# Patient Record
Sex: FEMALE | Race: WHITE | NOT HISPANIC OR LATINO | Employment: FULL TIME | URBAN - METROPOLITAN AREA
[De-identification: names, ages, dates, MRNs, and addresses within clinical notes are randomized per-mention and may not be internally consistent; named-entity substitution may affect disease eponyms.]

---

## 2023-05-19 LAB
ALANINE AMINOTRANSFERASE (SGPT) (U/L) IN SER/PLAS: 22 U/L (ref 7–45)
ALBUMIN (G/DL) IN SER/PLAS: 4.4 G/DL (ref 3.4–5)
ALBUMIN (MG/L) IN URINE: 7.9 MG/L
ALBUMIN/CREATININE (UG/MG) IN URINE: 7.2 UG/MG CRT (ref 0–30)
ALKALINE PHOSPHATASE (U/L) IN SER/PLAS: 68 U/L (ref 33–110)
ANION GAP IN SER/PLAS: 11 MMOL/L (ref 10–20)
ASPARTATE AMINOTRANSFERASE (SGOT) (U/L) IN SER/PLAS: 18 U/L (ref 9–39)
BILIRUBIN TOTAL (MG/DL) IN SER/PLAS: 0.5 MG/DL (ref 0–1.2)
CALCIDIOL (25 OH VITAMIN D3) (NG/ML) IN SER/PLAS: 48 NG/ML
CALCIUM (MG/DL) IN SER/PLAS: 10.2 MG/DL (ref 8.6–10.6)
CARBON DIOXIDE, TOTAL (MMOL/L) IN SER/PLAS: 30 MMOL/L (ref 21–32)
CHLORIDE (MMOL/L) IN SER/PLAS: 102 MMOL/L (ref 98–107)
CHOLESTEROL (MG/DL) IN SER/PLAS: 195 MG/DL (ref 0–199)
CHOLESTEROL IN HDL (MG/DL) IN SER/PLAS: 49.8 MG/DL
CHOLESTEROL/HDL RATIO: 3.9
CREATININE (MG/DL) IN SER/PLAS: 0.66 MG/DL (ref 0.5–1.05)
CREATININE (MG/DL) IN URINE: 109 MG/DL (ref 20–320)
ESTIMATED AVERAGE GLUCOSE FOR HBA1C: 134 MG/DL
GAMMA GLUTAMYL TRANSFERASE (U/L) IN SER/PLAS: 15 U/L (ref 5–55)
GFR FEMALE: >90 ML/MIN/1.73M2
GLUCOSE (MG/DL) IN SER/PLAS: 132 MG/DL (ref 74–99)
HEMOGLOBIN A1C/HEMOGLOBIN TOTAL IN BLOOD: 6.3 %
LDL: 124 MG/DL (ref 0–99)
POTASSIUM (MMOL/L) IN SER/PLAS: 4.9 MMOL/L (ref 3.5–5.3)
PROTEIN TOTAL: 7.6 G/DL (ref 6.4–8.2)
SODIUM (MMOL/L) IN SER/PLAS: 138 MMOL/L (ref 136–145)
THYROTROPIN (MIU/L) IN SER/PLAS BY DETECTION LIMIT <= 0.05 MIU/L: 2.3 MIU/L (ref 0.44–3.98)
TRIGLYCERIDE (MG/DL) IN SER/PLAS: 106 MG/DL (ref 0–149)
UREA NITROGEN (MG/DL) IN SER/PLAS: 12 MG/DL (ref 6–23)
VLDL: 21 MG/DL (ref 0–40)

## 2023-06-04 LAB — FECAL OCCULT BLD IMMUNOASSAY: NEGATIVE

## 2023-06-06 PROBLEM — E66.9 DIABETES MELLITUS TYPE 2 IN OBESE: Status: ACTIVE | Noted: 2022-03-01

## 2023-06-06 PROBLEM — E78.5 HYPERLIPIDEMIA: Status: ACTIVE | Noted: 2023-06-06

## 2023-06-06 PROBLEM — E11.69 DIABETES MELLITUS TYPE 2 IN OBESE: Status: ACTIVE | Noted: 2022-03-01

## 2023-06-06 RX ORDER — DAPAGLIFLOZIN 5 MG/1
TABLET, FILM COATED ORAL
COMMUNITY
Start: 2023-02-23 | End: 2023-06-07 | Stop reason: ALTCHOICE

## 2023-06-06 RX ORDER — SEMAGLUTIDE 1.34 MG/ML
INJECTION, SOLUTION SUBCUTANEOUS
COMMUNITY
Start: 2022-05-10 | End: 2023-06-06 | Stop reason: WASHOUT

## 2023-06-06 RX ORDER — SEMAGLUTIDE 1.34 MG/ML
INJECTION, SOLUTION SUBCUTANEOUS
COMMUNITY
Start: 2023-05-29 | End: 2023-12-01 | Stop reason: WASHOUT

## 2023-06-06 RX ORDER — ROSUVASTATIN CALCIUM 5 MG/1
1 TABLET, COATED ORAL DAILY
COMMUNITY
Start: 2022-04-25 | End: 2024-06-03 | Stop reason: SDUPTHER

## 2023-06-07 ENCOUNTER — OFFICE VISIT (OUTPATIENT)
Dept: PRIMARY CARE | Facility: CLINIC | Age: 58
End: 2023-06-07
Payer: COMMERCIAL

## 2023-06-07 VITALS
DIASTOLIC BLOOD PRESSURE: 78 MMHG | SYSTOLIC BLOOD PRESSURE: 115 MMHG | HEART RATE: 96 BPM | BODY MASS INDEX: 31.69 KG/M2 | WEIGHT: 162.25 LBS | TEMPERATURE: 97.6 F

## 2023-06-07 DIAGNOSIS — R13.10 DYSPHAGIA, UNSPECIFIED TYPE: ICD-10-CM

## 2023-06-07 DIAGNOSIS — E11.69 DIABETES MELLITUS TYPE 2 IN OBESE: Primary | ICD-10-CM

## 2023-06-07 DIAGNOSIS — E78.5 HYPERLIPIDEMIA, UNSPECIFIED HYPERLIPIDEMIA TYPE: ICD-10-CM

## 2023-06-07 DIAGNOSIS — Z12.39 BREAST SCREENING: ICD-10-CM

## 2023-06-07 DIAGNOSIS — Z00.00 HEALTHCARE MAINTENANCE: ICD-10-CM

## 2023-06-07 DIAGNOSIS — E66.9 DIABETES MELLITUS TYPE 2 IN OBESE: Primary | ICD-10-CM

## 2023-06-07 DIAGNOSIS — Z12.4 PAP SMEAR FOR CERVICAL CANCER SCREENING: ICD-10-CM

## 2023-06-07 PROCEDURE — 3074F SYST BP LT 130 MM HG: CPT | Performed by: INTERNAL MEDICINE

## 2023-06-07 PROCEDURE — 99214 OFFICE O/P EST MOD 30 MIN: CPT | Performed by: INTERNAL MEDICINE

## 2023-06-07 PROCEDURE — 1036F TOBACCO NON-USER: CPT | Performed by: INTERNAL MEDICINE

## 2023-06-07 PROCEDURE — 3044F HG A1C LEVEL LT 7.0%: CPT | Performed by: INTERNAL MEDICINE

## 2023-06-07 PROCEDURE — 3078F DIAST BP <80 MM HG: CPT | Performed by: INTERNAL MEDICINE

## 2023-06-07 NOTE — ASSESSMENT & PLAN NOTE
Well controlled only on ozempic, discontinued farxiga, no noted complications. No albuminuria, no retinopathy (last seen in October), no neuropathy, UTD with vaccinations.   Will followup with Amy Edmonds as well.

## 2023-06-07 NOTE — PROGRESS NOTES
Subjective   Patient ID: Kim Gallegos is a 57 y.o. female who presents for Follow-up.  HPI  57-year-old female here for follow-up visit, last seen in December for preventive care visit.  -At the last visit she complained of a syncopal event associated with right upper quadrant tenderness and mild transaminitis was noted, subsequently resolved.  Work-up for suspected related to neurocardiogenic origin. Never had recurrence in symptoms.   - Has been experiencing solid food dysphagia started 10 years ago comes and goes, admits to not drinking enough water, last episode on Saturday after swallowing a donut felt that it got stuck at the top of her throat, was able to wash it down with water but did panic at the time when it happened. Dysphagia occurs after swallowing. Over time believes symptoms are perhaps progressing.     5/23: ggt good   6/23: ifit good     PMHx:  - DM2 on ozempic 1mg - A1C 12.7% now 6.3%, on Ozempic and no additional complications.  History of hypoglycemic events which resolved. Discontinued farxiga out of concern for nausea   - HLD on rosuvastatin tolerating well most recent  - states she discontinued her crestor out of concern for morning nausea. The nausea varies in intensity, started around 6 months ago of unclear provocation. The nausea did not subside after stopping rosuvastatin, resumed over the last 1-2 weeks. Denies nausea/vomiting, no additional weight changes, no rashes, no RUQ tenderness.     Social:   - Lives at home with  (kidney transplant and legally blind) and 2 children (25 and 23)   - No tobacco, no alcohol, no drugs.   - Works with endocrinology in scheduling     Current Outpatient Medications   Medication Instructions    Ozempic 1 mg/dose (4 mg/3 mL) pen injector     rosuvastatin (Crestor) 5 mg tablet 1 tablet, oral, Daily        Objective     /78   Pulse 96   Temp 36.4 °C (97.6 °F)   Wt 73.6 kg (162 lb 4 oz)   BMI 31.69 kg/m²     Physical  Exam  General: Alert and oriented, in no apparent distress   HEENT: No conjunctival erythema, no external facial lesions   Lungs: Breathing comfortably  Skin: No evidence of skin breakdown.  Neuro: AAO x 3, answering questions appropriately, no obvious cranial nerve deficits    Assessment/Plan   Problem List Items Addressed This Visit          Endocrine/Metabolic    Diabetes mellitus type 2 in obese (CMS/HCC) - Primary     Well controlled only on ozempic, discontinued farxiga, no noted complications. No albuminuria, no retinopathy (last seen in October), no neuropathy, UTD with vaccinations.   Will followup with Amy Edmonds as well.             Other    Hyperlipidemia     Discontinued rosuvastatin, now agreeable to reinstitution with increase in LDL levels. Will recheck again in 6 months' time.           Other Visit Diagnoses       Breast screening        Relevant Orders    BI mammo bilateral screening tomosynthesis    Pap smear for cervical cancer screening        Relevant Orders    Referral to Gynecology    Dysphagia, unspecified type      Solid food progressive over the past 10 years, esophageal. No additional alarm features. Will refer to GI in consideration for EGD.     Relevant Orders    Referral to Gastroenterology    Healthcare maintenance        Relevant Orders    Follow Up In [facility] - PCP          Health Maintenance  Cancer Screening:  - iFIT 5/23   - Mammogram due (last 1/22)   - Pap due   Immunizations: UTD

## 2023-06-07 NOTE — ASSESSMENT & PLAN NOTE
Discontinued rosuvastatin, now agreeable to reinstitution with increase in LDL levels. Will recheck again in 6 months' time.

## 2023-08-23 PROBLEM — E11.9 NEWLY DIAGNOSED DIABETES (MULTI): Status: ACTIVE | Noted: 2023-08-23

## 2023-08-23 PROBLEM — R55 SYNCOPE: Status: ACTIVE | Noted: 2023-08-23
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
## 2023-08-23 PROBLEM — E66.9 OBESITY (BMI 30.0-34.9): Status: ACTIVE | Noted: 2023-08-23

## 2023-08-23 PROBLEM — E11.9 DIABETES MELLITUS (MULTI): Status: ACTIVE | Noted: 2023-08-23

## 2023-08-23 PROBLEM — E66.811 OBESITY (BMI 30.0-34.9): Status: ACTIVE | Noted: 2023-08-23

## 2023-08-23 PROBLEM — H25.10 NUCLEAR SENILE CATARACT: Status: ACTIVE | Noted: 2023-08-23

## 2023-08-23 PROBLEM — R74.01 TRANSAMINITIS: Status: ACTIVE | Noted: 2023-08-23

## 2023-08-23 PROBLEM — E11.65 UNCONTROLLED TYPE 2 DIABETES MELLITUS WITH HYPERGLYCEMIA (MULTI): Status: ACTIVE | Noted: 2023-08-23

## 2023-08-23 RX ORDER — LISINOPRIL 2.5 MG/1
1 TABLET ORAL DAILY
COMMUNITY
Start: 2020-02-21 | End: 2023-12-01 | Stop reason: WASHOUT

## 2023-08-23 RX ORDER — GLIPIZIDE AND METFORMIN HCL 2.5; 5 MG/1; MG/1
2 TABLET, FILM COATED ORAL
COMMUNITY
Start: 2020-02-24 | End: 2023-12-01 | Stop reason: WASHOUT

## 2023-08-23 RX ORDER — ONDANSETRON 4 MG/1
1 TABLET, FILM COATED ORAL EVERY 6 HOURS PRN
COMMUNITY
End: 2023-12-01 | Stop reason: WASHOUT

## 2023-08-23 RX ORDER — FLUTICASONE PROPIONATE 50 MCG
SPRAY, SUSPENSION (ML) NASAL
COMMUNITY
Start: 2022-12-09 | End: 2024-02-09 | Stop reason: WASHOUT

## 2023-08-23 RX ORDER — ESOMEPRAZOLE MAGNESIUM 40 MG/1
1 CAPSULE, DELAYED RELEASE ORAL
COMMUNITY
Start: 2015-08-31 | End: 2023-12-01 | Stop reason: WASHOUT

## 2023-08-23 RX ORDER — LANCETS 26 GAUGE
EACH MISCELLANEOUS DAILY
COMMUNITY
Start: 2020-02-25 | End: 2023-12-01 | Stop reason: WASHOUT

## 2023-08-23 RX ORDER — METFORMIN HYDROCHLORIDE 500 MG/1
1 TABLET ORAL 2 TIMES DAILY
COMMUNITY
Start: 2016-02-09 | End: 2023-12-01 | Stop reason: WASHOUT

## 2023-08-23 RX ORDER — ACETAMINOPHEN 500 MG
5000 TABLET ORAL DAILY
COMMUNITY
End: 2023-12-01 | Stop reason: WASHOUT

## 2023-08-23 RX ORDER — SERTRALINE HYDROCHLORIDE 100 MG/1
1 TABLET, FILM COATED ORAL DAILY
COMMUNITY
Start: 2016-02-05 | End: 2023-12-01 | Stop reason: WASHOUT

## 2023-08-23 RX ORDER — LEVOTHYROXINE SODIUM 50 UG/1
1 TABLET ORAL
COMMUNITY
Start: 2015-11-19 | End: 2023-12-01 | Stop reason: WASHOUT

## 2023-08-23 RX ORDER — LEVOTHYROXINE SODIUM 75 UG/1
1 TABLET ORAL
COMMUNITY
Start: 2016-02-12 | End: 2023-12-01 | Stop reason: WASHOUT

## 2023-08-23 RX ORDER — SEMAGLUTIDE 0.68 MG/ML
INJECTION, SOLUTION SUBCUTANEOUS
COMMUNITY
Start: 2022-05-10 | End: 2023-12-01 | Stop reason: WASHOUT

## 2023-10-11 ENCOUNTER — NURSE ONLY (OUTPATIENT)
Dept: ENDOCRINOLOGY | Facility: CLINIC | Age: 58
End: 2023-10-11
Payer: COMMERCIAL

## 2023-10-11 DIAGNOSIS — Z23 FLU VACCINE NEED: Primary | ICD-10-CM

## 2023-10-11 PROCEDURE — 90686 IIV4 VACC NO PRSV 0.5 ML IM: CPT | Performed by: NURSE PRACTITIONER

## 2023-10-11 PROCEDURE — 90471 IMMUNIZATION ADMIN: CPT | Performed by: NURSE PRACTITIONER

## 2023-10-11 NOTE — PROGRESS NOTES
Patient in office today. Influenza vaccine given IM into left deltoid. Stock medication given. No adverse reaction, no c/o pain or discomfort expressed at this time.

## 2023-10-16 ENCOUNTER — APPOINTMENT (OUTPATIENT)
Dept: OPHTHALMOLOGY | Facility: CLINIC | Age: 58
End: 2023-10-16

## 2023-10-18 ENCOUNTER — PHARMACY VISIT (OUTPATIENT)
Dept: PHARMACY | Facility: CLINIC | Age: 58
End: 2023-10-18
Payer: COMMERCIAL

## 2023-10-18 PROCEDURE — RXMED WILLOW AMBULATORY MEDICATION CHARGE

## 2023-10-28 ENCOUNTER — PHARMACY VISIT (OUTPATIENT)
Dept: PHARMACY | Facility: CLINIC | Age: 58
End: 2023-10-28
Payer: COMMERCIAL

## 2023-10-28 PROCEDURE — RXMED WILLOW AMBULATORY MEDICATION CHARGE

## 2023-11-24 ENCOUNTER — HOSPITAL ENCOUNTER (OUTPATIENT)
Dept: RADIOLOGY | Facility: HOSPITAL | Age: 58
Discharge: HOME | End: 2023-11-24
Payer: COMMERCIAL

## 2023-11-24 DIAGNOSIS — Z12.39 BREAST SCREENING: ICD-10-CM

## 2023-11-24 PROCEDURE — 77063 BREAST TOMOSYNTHESIS BI: CPT | Mod: BILATERAL PROCEDURE | Performed by: RADIOLOGY

## 2023-11-24 PROCEDURE — 77067 SCR MAMMO BI INCL CAD: CPT

## 2023-11-24 PROCEDURE — 77067 SCR MAMMO BI INCL CAD: CPT | Mod: BILATERAL PROCEDURE | Performed by: RADIOLOGY

## 2023-11-27 ENCOUNTER — PHARMACY VISIT (OUTPATIENT)
Dept: PHARMACY | Facility: CLINIC | Age: 58
End: 2023-11-27
Payer: COMMERCIAL

## 2023-11-27 PROCEDURE — RXMED WILLOW AMBULATORY MEDICATION CHARGE

## 2023-12-01 ENCOUNTER — OFFICE VISIT (OUTPATIENT)
Dept: GASTROENTEROLOGY | Facility: CLINIC | Age: 58
End: 2023-12-01
Payer: COMMERCIAL

## 2023-12-01 VITALS
WEIGHT: 171.4 LBS | HEIGHT: 60 IN | SYSTOLIC BLOOD PRESSURE: 142 MMHG | HEART RATE: 103 BPM | TEMPERATURE: 97.9 F | BODY MASS INDEX: 33.65 KG/M2 | DIASTOLIC BLOOD PRESSURE: 88 MMHG

## 2023-12-01 DIAGNOSIS — R13.10 DYSPHAGIA, UNSPECIFIED TYPE: ICD-10-CM

## 2023-12-01 PROCEDURE — 1036F TOBACCO NON-USER: CPT | Performed by: NURSE PRACTITIONER

## 2023-12-01 PROCEDURE — 3077F SYST BP >= 140 MM HG: CPT | Performed by: NURSE PRACTITIONER

## 2023-12-01 PROCEDURE — 99204 OFFICE O/P NEW MOD 45 MIN: CPT | Performed by: NURSE PRACTITIONER

## 2023-12-01 PROCEDURE — 3044F HG A1C LEVEL LT 7.0%: CPT | Performed by: NURSE PRACTITIONER

## 2023-12-01 PROCEDURE — 3079F DIAST BP 80-89 MM HG: CPT | Performed by: NURSE PRACTITIONER

## 2023-12-01 PROCEDURE — 99214 OFFICE O/P EST MOD 30 MIN: CPT | Performed by: NURSE PRACTITIONER

## 2023-12-01 ASSESSMENT — ENCOUNTER SYMPTOMS
ALLERGIC/IMMUNOLOGIC NEGATIVE: 1
ENDOCRINE NEGATIVE: 1
MUSCULOSKELETAL NEGATIVE: 1
ROS GI COMMENTS: DYSPHAGIA
PSYCHIATRIC NEGATIVE: 1
NEUROLOGICAL NEGATIVE: 1
CONSTITUTIONAL NEGATIVE: 1
EYES NEGATIVE: 1
HEMATOLOGIC/LYMPHATIC NEGATIVE: 1
RESPIRATORY NEGATIVE: 1
GASTROINTESTINAL NEGATIVE: 1
CARDIOVASCULAR NEGATIVE: 1

## 2023-12-01 ASSESSMENT — PAIN SCALES - GENERAL: PAINLEVEL: 0-NO PAIN

## 2023-12-01 NOTE — PROGRESS NOTES
Subjective   Patient ID: Kim Gallegos is a 58 y.o. female who presents for No chief complaint on file..  HPI  58-year-old female for evaluation of dysphagia  Medical history includes obesity, diabetes type 2, HPL  She is on Ozempic  Labs reviewed 5/20/2023 negative fecal occult blood test  TSH 2.03  Normal LFTs    Started about 10 years ago  When she would eat bread felt like it would get stuck mid chest- water is the only thing that would let it go down  Then lost 100 lbs intentionally  This year has gotten much worse- has a 16 oz bottle of water to be able to eat meal  Eventually the food goes down  Gets panic feeling when it happens  has gotten worse since starting ozempic- got pancreatitis in the beginning  No fhx colon cancer      Review of Systems   Constitutional: Negative.    HENT: Negative.     Eyes: Negative.    Respiratory: Negative.     Cardiovascular: Negative.    Gastrointestinal: Negative.         Dysphagia   Endocrine: Negative.    Genitourinary: Negative.    Musculoskeletal: Negative.    Skin: Negative.    Allergic/Immunologic: Negative.    Neurological: Negative.    Hematological: Negative.    Psychiatric/Behavioral: Negative.         Objective   Physical Exam  Constitutional:       Appearance: Normal appearance.   HENT:      Head: Normocephalic.      Nose: Nose normal.      Mouth/Throat:      Mouth: Mucous membranes are moist.   Eyes:      Pupils: Pupils are equal, round, and reactive to light.   Cardiovascular:      Rate and Rhythm: Normal rate and regular rhythm.      Pulses: Normal pulses.      Heart sounds: Normal heart sounds.   Pulmonary:      Effort: Pulmonary effort is normal.      Breath sounds: Normal breath sounds.   Abdominal:      General: Bowel sounds are normal.      Palpations: Abdomen is soft.   Musculoskeletal:         General: Normal range of motion.      Cervical back: Normal range of motion.   Skin:     General: Skin is warm and dry.   Neurological:      Mental Status: She  is alert.   Psychiatric:         Mood and Affect: Mood normal.         Assessment/Plan     Izzy- phargyngeal- Dysphagia- I would recommend getting an EGD with anesthesia and an esophagram to evaluate the upper GI tract.     I will call you with the results and determine follow up

## 2023-12-01 NOTE — PATIENT INSTRUCTIONS
Izzy- phargyngeal- Dysphagia- I would recommend getting an EGD with anesthesia and an esophagram to evaluate the upper GI tract.     I will call you with the results and determine follow up

## 2023-12-12 ENCOUNTER — APPOINTMENT (OUTPATIENT)
Dept: PRIMARY CARE | Facility: CLINIC | Age: 58
End: 2023-12-12
Payer: COMMERCIAL

## 2023-12-15 ENCOUNTER — APPOINTMENT (OUTPATIENT)
Dept: RADIOLOGY | Facility: HOSPITAL | Age: 58
End: 2023-12-15
Payer: COMMERCIAL

## 2023-12-29 ENCOUNTER — APPOINTMENT (OUTPATIENT)
Dept: RADIOLOGY | Facility: HOSPITAL | Age: 58
End: 2023-12-29
Payer: COMMERCIAL

## 2024-01-08 ENCOUNTER — HOSPITAL ENCOUNTER (OUTPATIENT)
Dept: RADIOLOGY | Facility: HOSPITAL | Age: 59
Discharge: HOME | End: 2024-01-08
Payer: COMMERCIAL

## 2024-01-08 DIAGNOSIS — R13.10 DYSPHAGIA, UNSPECIFIED TYPE: ICD-10-CM

## 2024-01-08 PROCEDURE — 74220 X-RAY XM ESOPHAGUS 1CNTRST: CPT

## 2024-01-08 PROCEDURE — A9698 NON-RAD CONTRAST MATERIALNOC: HCPCS | Performed by: INTERNAL MEDICINE

## 2024-01-08 PROCEDURE — 2500000001 HC RX 250 WO HCPCS SELF ADMINISTERED DRUGS (ALT 637 FOR MEDICARE OP): Performed by: INTERNAL MEDICINE

## 2024-01-08 PROCEDURE — 3430000001 HC RX 343 DIAGNOSTIC RADIOPHARMACEUTICALS: Performed by: INTERNAL MEDICINE

## 2024-01-08 RX ADMIN — BARIUM SULFATE 100 ML: 980 POWDER, FOR SUSPENSION ORAL at 10:10

## 2024-01-08 RX ADMIN — ANTACID/ANTIFLATULENT 1 PACKET: 380; 550; 10; 10 GRANULE, EFFERVESCENT ORAL at 10:39

## 2024-01-08 RX ADMIN — BARIUM SULFATE 100 ML: 0.6 SUSPENSION ORAL at 10:17

## 2024-01-29 ENCOUNTER — ANESTHESIA (OUTPATIENT)
Dept: GASTROENTEROLOGY | Facility: HOSPITAL | Age: 59
End: 2024-01-29
Payer: COMMERCIAL

## 2024-01-29 ENCOUNTER — HOSPITAL ENCOUNTER (OUTPATIENT)
Dept: GASTROENTEROLOGY | Facility: HOSPITAL | Age: 59
Setting detail: OUTPATIENT SURGERY
Discharge: HOME | End: 2024-01-29
Payer: COMMERCIAL

## 2024-01-29 ENCOUNTER — ANESTHESIA EVENT (OUTPATIENT)
Dept: GASTROENTEROLOGY | Facility: HOSPITAL | Age: 59
End: 2024-01-29
Payer: COMMERCIAL

## 2024-01-29 VITALS
RESPIRATION RATE: 14 BRPM | TEMPERATURE: 97 F | WEIGHT: 178.57 LBS | BODY MASS INDEX: 34.88 KG/M2 | SYSTOLIC BLOOD PRESSURE: 116 MMHG | DIASTOLIC BLOOD PRESSURE: 66 MMHG | HEART RATE: 62 BPM | OXYGEN SATURATION: 98 %

## 2024-01-29 DIAGNOSIS — K22.2 ESOPHAGEAL STENOSIS: Primary | ICD-10-CM

## 2024-01-29 DIAGNOSIS — R13.10 DYSPHAGIA, UNSPECIFIED TYPE: ICD-10-CM

## 2024-01-29 DIAGNOSIS — K22.2 GERD WITH STRICTURE: ICD-10-CM

## 2024-01-29 DIAGNOSIS — K21.9 GERD WITH STRICTURE: ICD-10-CM

## 2024-01-29 LAB
GLUCOSE BLD MANUAL STRIP-MCNC: 206 MG/DL (ref 74–99)
GLUCOSE BLD MANUAL STRIP-MCNC: 257 MG/DL (ref 74–99)

## 2024-01-29 PROCEDURE — 2500000004 HC RX 250 GENERAL PHARMACY W/ HCPCS (ALT 636 FOR OP/ED): Performed by: ANESTHESIOLOGY

## 2024-01-29 PROCEDURE — A43239 PR EDG TRANSORAL BIOPSY SINGLE/MULTIPLE: Performed by: ANESTHESIOLOGIST ASSISTANT

## 2024-01-29 PROCEDURE — 2720000007 HC OR 272 NO HCPCS

## 2024-01-29 PROCEDURE — A43239 PR EDG TRANSORAL BIOPSY SINGLE/MULTIPLE: Performed by: ANESTHESIOLOGY

## 2024-01-29 PROCEDURE — 7100000010 HC PHASE TWO TIME - EACH INCREMENTAL 1 MINUTE

## 2024-01-29 PROCEDURE — 82947 ASSAY GLUCOSE BLOOD QUANT: CPT

## 2024-01-29 PROCEDURE — 43235 EGD DIAGNOSTIC BRUSH WASH: CPT | Performed by: INTERNAL MEDICINE

## 2024-01-29 PROCEDURE — 43249 ESOPH EGD DILATION <30 MM: CPT | Performed by: INTERNAL MEDICINE

## 2024-01-29 PROCEDURE — RXMED WILLOW AMBULATORY MEDICATION CHARGE

## 2024-01-29 PROCEDURE — 2500000004 HC RX 250 GENERAL PHARMACY W/ HCPCS (ALT 636 FOR OP/ED): Performed by: ANESTHESIOLOGIST ASSISTANT

## 2024-01-29 PROCEDURE — 7100000009 HC PHASE TWO TIME - INITIAL BASE CHARGE

## 2024-01-29 PROCEDURE — 3700000002 HC GENERAL ANESTHESIA TIME - EACH INCREMENTAL 1 MINUTE

## 2024-01-29 PROCEDURE — 3700000001 HC GENERAL ANESTHESIA TIME - INITIAL BASE CHARGE

## 2024-01-29 PROCEDURE — C1726 CATH, BAL DIL, NON-VASCULAR: HCPCS

## 2024-01-29 RX ORDER — MIDAZOLAM HYDROCHLORIDE 1 MG/ML
INJECTION, SOLUTION INTRAMUSCULAR; INTRAVENOUS AS NEEDED
Status: DISCONTINUED | OUTPATIENT
Start: 2024-01-29 | End: 2024-01-29

## 2024-01-29 RX ORDER — FENTANYL CITRATE 50 UG/ML
INJECTION, SOLUTION INTRAMUSCULAR; INTRAVENOUS AS NEEDED
Status: DISCONTINUED | OUTPATIENT
Start: 2024-01-29 | End: 2024-01-29

## 2024-01-29 RX ORDER — OMEPRAZOLE 40 MG/1
40 CAPSULE, DELAYED RELEASE ORAL DAILY
Qty: 90 CAPSULE | Refills: 3 | Status: SHIPPED | OUTPATIENT
Start: 2024-01-29 | End: 2024-03-19 | Stop reason: WASHOUT

## 2024-01-29 RX ORDER — PROPOFOL 10 MG/ML
INJECTION, EMULSION INTRAVENOUS CONTINUOUS PRN
Status: DISCONTINUED | OUTPATIENT
Start: 2024-01-29 | End: 2024-01-29

## 2024-01-29 RX ORDER — ONDANSETRON HYDROCHLORIDE 2 MG/ML
4 INJECTION, SOLUTION INTRAVENOUS ONCE
Status: COMPLETED | OUTPATIENT
Start: 2024-01-29 | End: 2024-01-29

## 2024-01-29 RX ORDER — SODIUM CHLORIDE, SODIUM LACTATE, POTASSIUM CHLORIDE, CALCIUM CHLORIDE 600; 310; 30; 20 MG/100ML; MG/100ML; MG/100ML; MG/100ML
20 INJECTION, SOLUTION INTRAVENOUS CONTINUOUS
Status: DISCONTINUED | OUTPATIENT
Start: 2024-01-29 | End: 2024-01-30 | Stop reason: HOSPADM

## 2024-01-29 RX ADMIN — PROPOFOL 100 MCG/KG/MIN: 10 INJECTION, EMULSION INTRAVENOUS at 08:59

## 2024-01-29 RX ADMIN — MIDAZOLAM HYDROCHLORIDE 2 MG: 1 INJECTION INTRAMUSCULAR; INTRAVENOUS at 08:57

## 2024-01-29 RX ADMIN — ONDANSETRON 4 MG: 2 INJECTION INTRAMUSCULAR; INTRAVENOUS at 09:55

## 2024-01-29 RX ADMIN — FENTANYL CITRATE 50 MCG: 50 INJECTION, SOLUTION INTRAMUSCULAR; INTRAVENOUS at 08:55

## 2024-01-29 RX ADMIN — FENTANYL CITRATE 50 MCG: 50 INJECTION, SOLUTION INTRAMUSCULAR; INTRAVENOUS at 08:58

## 2024-01-29 ASSESSMENT — PAIN - FUNCTIONAL ASSESSMENT
PAIN_FUNCTIONAL_ASSESSMENT: 0-10

## 2024-01-29 ASSESSMENT — PAIN SCALES - GENERAL
PAINLEVEL_OUTOF10: 0 - NO PAIN

## 2024-01-29 ASSESSMENT — COLUMBIA-SUICIDE SEVERITY RATING SCALE - C-SSRS
2. HAVE YOU ACTUALLY HAD ANY THOUGHTS OF KILLING YOURSELF?: NO
6. HAVE YOU EVER DONE ANYTHING, STARTED TO DO ANYTHING, OR PREPARED TO DO ANYTHING TO END YOUR LIFE?: NO
1. IN THE PAST MONTH, HAVE YOU WISHED YOU WERE DEAD OR WISHED YOU COULD GO TO SLEEP AND NOT WAKE UP?: NO

## 2024-01-29 ASSESSMENT — ENCOUNTER SYMPTOMS: CONSTITUTIONAL NEGATIVE: 1

## 2024-01-29 NOTE — H&P
History Of Present Illness  Kim Gallegos is a 58 y.o. female presenting with solid more than liquid dysphagia and GERD probably related to GLRA medication here for first EGD and possible dilation.  Her barium swallow revealed a sliding hiatal hernia.     Past Medical History  Past Medical History:   Diagnosis Date    Diabetes mellitus (CMS/HCC) 3/1/2022    Dysphagia     Hyperlipidemia     Varicella 1970    Visual impairment      Surgical History  Past Surgical History:   Procedure Laterality Date     SECTION, LOW TRANSVERSE  1997     Social History  She reports that she quit smoking about 22 years ago. Her smoking use included cigarettes. She started smoking about 23 years ago. She has a 0.25 pack-year smoking history. She has never used smokeless tobacco. She reports current alcohol use of about 1.0 standard drink of alcohol per week. She reports that she does not use drugs.    Family History  Family History   Problem Relation Name Age of Onset    Breast cancer Mother      Heart disease Father Dad     Mental illness Father Dad     Diabetes Father Dad     Multiple sclerosis Sister      No Known Problems Brother      No Known Problems Son      No Known Problems Son      No Known Problems Sibling          Allergies  No Known Allergies  Review of Systems   Constitutional: Negative.         Physical Exam  Constitutional:       Appearance: Normal appearance. She is obese.   Cardiovascular:      Rate and Rhythm: Normal rate.   Pulmonary:      Effort: Pulmonary effort is normal.      Breath sounds: Normal breath sounds.   Abdominal:      Palpations: Abdomen is soft.   Neurological:      Mental Status: She is alert.   Psychiatric:         Judgment: Judgment normal.      Comments: Anxious           Last Recorded Vitals  Weight 81 kg (178 lb 9.2 oz).    Assessment/Plan   EGD for dysphagia to solids > liquids     Julian Mccurdy, DO

## 2024-01-29 NOTE — ANESTHESIA POSTPROCEDURE EVALUATION
Patient: Kim Gallegos    Procedure Summary       Date: 01/29/24 Room / Location: [facility]    Anesthesia Start: 0857 Anesthesia Stop: 0913    Procedure: EGD Diagnosis: Dysphagia, unspecified type    Scheduled Providers: Julian Mccurdy DO; Cecilio Naylor MD; DAISY Thompson Responsible Provider: Cecilio Naylor MD    Anesthesia Type: MAC ASA Status: 2            Anesthesia Type: MAC    Vitals Value Taken Time   /66 01/29/24 0943   Temp 36.1 °C (97 °F) 01/29/24 0943   Pulse 62 01/29/24 0943   Resp 14 01/29/24 0943   SpO2 97 % 01/29/24 0944   Vitals shown include unvalidated device data.    Anesthesia Post Evaluation    Patient location during evaluation: PACU  Patient participation: complete - patient participated  Level of consciousness: awake and alert  Pain management: adequate  Airway patency: patent  Cardiovascular status: acceptable and hemodynamically stable  Respiratory status: acceptable, spontaneous ventilation and nonlabored ventilation  Hydration status: acceptable  Postoperative Nausea and Vomiting: none        There were no known notable events for this encounter.

## 2024-01-29 NOTE — ANESTHESIA PREPROCEDURE EVALUATION
Patient: Kim Gallegos    Procedure Information       Date/Time: 24 0950    Scheduled providers: Julian Mccurdy DO; Cecilio Naylor MD; DAISY Thompson    Procedure: EGD    Location: [facility]            Relevant Problems   Cardiovascular   (+) Hyperlipidemia      Endocrine   (+) Diabetes mellitus type 2 in obese (CMS/HCC)       Clinical information reviewed:   Tobacco  Allergies  Meds   Med Hx  Surg Hx  OB Status  Fam Hx  Soc   Hx        NPO/Void Status  Carbohydrate Drink Given Prior to Surgery? : N  Date of Last Liquid: 24  Time of Last Liquid:   Date of Last Solid: 24  Time of Last Solid:   Last Intake Type: Clear fluids  Time of Last Void: 829           Past Medical History:   Diagnosis Date    Diabetes mellitus (CMS/HCC) 3/1/2022    Dysphagia     Hyperlipidemia     Varicella 1970    Visual impairment       Past Surgical History:   Procedure Laterality Date     SECTION, LOW TRANSVERSE  1997     Social History     Tobacco Use    Smoking status: Former     Packs/day: 0.25     Years: 1.00     Additional pack years: 0.00     Total pack years: 0.25     Types: Cigarettes     Start date: 2001     Quit date: 2002     Years since quittin.0    Smokeless tobacco: Never    Tobacco comments:     Mostly off with me passively picking it up for a year here and there until    Vaping Use    Vaping Use: Never used   Substance Use Topics    Alcohol use: Yes     Alcohol/week: 1.0 standard drink of alcohol     Types: 1 Shots of liquor per week    Drug use: Never      Current Outpatient Medications   Medication Instructions    fluticasone (Flonase Allergy Relief) 50 mcg/actuation nasal spray nasal    rosuvastatin (Crestor) 5 mg tablet 1 tablet, oral, Daily    semaglutide 0.25 mg or 0.5 mg (2 mg/3 mL) pen injector INJECT 0.5 MG UNDER THE SKIN ONCE WEEKLY AS DIRECTED      No Known Allergies     Chemistry    Lab Results   Component Value Date/Time    NA  138 05/19/2023 0810    K 4.9 05/19/2023 0810     05/19/2023 0810    CO2 30 05/19/2023 0810    BUN 12 05/19/2023 0810    CREATININE 0.66 05/19/2023 0810    Lab Results   Component Value Date/Time    CALCIUM 10.2 05/19/2023 0810    ALKPHOS 68 05/19/2023 0810    AST 18 05/19/2023 0810    ALT 22 05/19/2023 0810    BILITOT 0.5 05/19/2023 0810          Lab Results   Component Value Date/Time    WBC 5.9 05/27/2022 0714    HGB 13.8 05/27/2022 0714    HCT 41.6 05/27/2022 0714     05/27/2022 0714     Lab Results   Component Value Date/Time    PROTIME 10.4 02/14/2020 1659    INR 1.0 02/14/2020 1659     No results found for this or any previous visit (from the past 4464 hour(s)).  No results found for this or any previous visit from the past 1095 days.       Visit Vitals  OB Status Postmenopausal   Smoking Status Former        Physical Exam    Airway  Mallampati: III  TM distance: >3 FB  Neck ROM: full     Cardiovascular   Rhythm: regular  Rate: normal     Dental - normal exam     Pulmonary   Breath sounds clear to auscultation     Abdominal - normal exam              Anesthesia Plan    History of general anesthesia?: no  History of complications of general anesthesia?: no    ASA 2     MAC     intravenous induction   Anesthetic plan and risks discussed with patient.    Plan discussed with CRNA and CAA.

## 2024-01-29 NOTE — DISCHARGE INSTRUCTIONS

## 2024-01-29 NOTE — NURSING NOTE
0913: Patient brought to post endo bay 35  by endo staff and anesthesia. Patient vitals stable. Will continue to monitor.    0928: Patient vitals remain stable. Patient tolerating PO. Dr. Singh spoke with patient and family about procedure. Will continue to monitor.     0937: Discharge instructions reviewed with patient and family. All instructions understood. Dr. Singh's card provided    0943: Patient vitals remain stable. Patient to get dress. Transport to be placed for patient.    0955- patient became nauseous and vomited while getting dressed. Dr. Naylor nitified and placed order for 4mg IV zofran. Will continue to monitor. Transport cancelled.    1015: patient nausea has subsided. To monitor a few more minutes and patient is okay to discharge.    1020: Patient IV removed- transport requested     1027: Patient picked up by transport and taken to main lobby via wheelchair. No further needs at this time.

## 2024-01-30 ENCOUNTER — PHARMACY VISIT (OUTPATIENT)
Dept: PHARMACY | Facility: CLINIC | Age: 59
End: 2024-01-30
Payer: COMMERCIAL

## 2024-01-31 PROCEDURE — RXMED WILLOW AMBULATORY MEDICATION CHARGE

## 2024-02-02 ENCOUNTER — PHARMACY VISIT (OUTPATIENT)
Dept: PHARMACY | Facility: CLINIC | Age: 59
End: 2024-02-02
Payer: COMMERCIAL

## 2024-02-07 DIAGNOSIS — Z00.00 ENCOUNTER FOR PREVENTATIVE ADULT HEALTH CARE EXAMINATION: Primary | ICD-10-CM

## 2024-02-07 DIAGNOSIS — E11.9 TYPE 2 DIABETES MELLITUS WITHOUT COMPLICATION, WITHOUT LONG-TERM CURRENT USE OF INSULIN (MULTI): ICD-10-CM

## 2024-02-08 DIAGNOSIS — Z01.84 IMMUNITY STATUS TESTING: Primary | ICD-10-CM

## 2024-02-09 ENCOUNTER — OFFICE VISIT (OUTPATIENT)
Dept: OPHTHALMOLOGY | Facility: CLINIC | Age: 59
End: 2024-02-09
Payer: COMMERCIAL

## 2024-02-09 DIAGNOSIS — E11.69 DIABETES MELLITUS TYPE 2 IN OBESE: Primary | ICD-10-CM

## 2024-02-09 DIAGNOSIS — E66.9 DIABETES MELLITUS TYPE 2 IN OBESE: Primary | ICD-10-CM

## 2024-02-09 DIAGNOSIS — H52.7 REFRACTION ERROR: ICD-10-CM

## 2024-02-09 DIAGNOSIS — H25.13 NUCLEAR SENILE CATARACT OF BOTH EYES: ICD-10-CM

## 2024-02-09 PROBLEM — E11.9 TYPE 2 DIABETES MELLITUS WITHOUT COMPLICATION, WITHOUT LONG-TERM CURRENT USE OF INSULIN (MULTI): Status: ACTIVE | Noted: 2022-03-01

## 2024-02-09 PROCEDURE — 99214 OFFICE O/P EST MOD 30 MIN: CPT | Performed by: OPHTHALMOLOGY

## 2024-02-09 PROCEDURE — 92015 DETERMINE REFRACTIVE STATE: CPT | Performed by: OPHTHALMOLOGY

## 2024-02-09 RX ORDER — ONDANSETRON 4 MG/1
4 TABLET, FILM COATED ORAL EVERY 6 HOURS PRN
COMMUNITY
End: 2024-02-09 | Stop reason: WASHOUT

## 2024-02-09 ASSESSMENT — REFRACTION_WEARINGRX
OD_CYLINDER: -1.50
OS_SPHERE: +1.25
OD_AXIS: 097
SPECS_TYPE: PAL
OD_SPHERE: +1.25
OS_AXIS: 088
OS_CYLINDER: -1.75
OD_ADD: +2.50
OS_ADD: +2.50

## 2024-02-09 ASSESSMENT — KERATOMETRY
OS_K2POWER_DIOPTERS: 45.75
METHOD_AUTO_MANUAL: AUTOMATED
OS_K1POWER_DIOPTERS: 44.50
OS_AXISANGLE_DEGREES: 10
OD_AXISANGLE_DEGREES: 180
OD_AXISANGLE2_DEGREES: 90
OS_AXISANGLE2_DEGREES: 100
OD_K1POWER_DIOPTERS: 45.00
OD_K2POWER_DIOPTERS: 46.25

## 2024-02-09 ASSESSMENT — ENCOUNTER SYMPTOMS
EYES NEGATIVE: 0
ENDOCRINE NEGATIVE: 0
NEUROLOGICAL NEGATIVE: 0
LOSS OF SENSATION IN FEET: 0
GASTROINTESTINAL NEGATIVE: 1
HEMATOLOGIC/LYMPHATIC NEGATIVE: 0
CARDIOVASCULAR NEGATIVE: 0
OCCASIONAL FEELINGS OF UNSTEADINESS: 0
ROS GI COMMENTS: TROUBLE SWALLOWING
MUSCULOSKELETAL NEGATIVE: 0
DEPRESSION: 0
PSYCHIATRIC NEGATIVE: 0
RESPIRATORY NEGATIVE: 0
CONSTITUTIONAL NEGATIVE: 0
ALLERGIC/IMMUNOLOGIC NEGATIVE: 0

## 2024-02-09 ASSESSMENT — SLIT LAMP EXAM - LIDS
COMMENTS: NORMAL
COMMENTS: NORMAL

## 2024-02-09 ASSESSMENT — VISUAL ACUITY
OD_CC: 20/25
CORRECTION_TYPE: GLASSES
OS_CC+: -2
OD_CC+: +2
OS_CC: 20/25
METHOD: SNELLEN - SINGLE

## 2024-02-09 ASSESSMENT — REFRACTION_MANIFEST
OS_AXIS: 095
OD_SPHERE: +2.00
METHOD_AUTOREFRACTION: 1
OD_AXIS: 090
OS_SPHERE: +2.00
OS_CYLINDER: -2.00
OD_CYLINDER: -2.25

## 2024-02-09 ASSESSMENT — EXTERNAL EXAM - RIGHT EYE: OD_EXAM: NORMAL

## 2024-02-09 ASSESSMENT — PATIENT HEALTH QUESTIONNAIRE - PHQ9
1. LITTLE INTEREST OR PLEASURE IN DOING THINGS: NOT AT ALL
2. FEELING DOWN, DEPRESSED OR HOPELESS: NOT AT ALL
SUM OF ALL RESPONSES TO PHQ9 QUESTIONS 1 AND 2: 0

## 2024-02-09 ASSESSMENT — CUP TO DISC RATIO
OD_RATIO: 0.3
OS_RATIO: 0.3

## 2024-02-09 ASSESSMENT — EXTERNAL EXAM - LEFT EYE: OS_EXAM: NORMAL

## 2024-02-09 ASSESSMENT — TONOMETRY
OS_IOP_MMHG: 18
OD_IOP_MMHG: 15
IOP_METHOD: GOLDMANN APPLANATION

## 2024-02-09 ASSESSMENT — PAIN SCALES - GENERAL: PAINLEVEL: 0-NO PAIN

## 2024-02-09 NOTE — ASSESSMENT & PLAN NOTE
Non significant cataract noted on exam. Will plan to continue to monitor with serial exam. May have some component of glare but OK with waiting to test glare until next year, will call us sooner if worsening symptoms.

## 2024-02-09 NOTE — LETTER
February 9, 2024    Aditi Pena DO  [address] [facility] [address]    Patient: Kim Gallegos   YOB: 1965   Date of Visit: 2/9/2024       Dear Dr. Aditi Pena DO:    Thank you for referring Kim Gallegos to me for evaluation. Here is my assessment and plan of care:    Assessment/Plan:  Kim was seen today for annual exam.  Diagnoses and all orders for this visit:  Diabetes mellitus type 2 in obese (CMS/HCC) (Primary)  Nuclear senile cataract of both eyes  Refraction error    Right eye:     Left eye:    [x] No diabetes mellitus (DM) retinopathy [x] No diabetes mellitus (DM) retinopathy    [] Mild non-proliferative retinopathy [] Mild non-proliferative retinopathy    [] Moderate non-proliferative retinopathy [] Moderate non-proliferative retinopathy    [] Severe non-proliferative retinopathy [] Severe non-proliferative retinopathy    [] Proliferative retinopathy   [] Proliferative retinopathy    [] Diabetic macular edema   [] Diabetic macular edema    Urged patient to continue to work on best blood sugar and blood pressure control  Advised patient to call if any new vision changes noted    Will plan to repeat evaluation in:   [] 3 months [] 6 months [] 9 months [x] 12 months [] Other    Below you can find relevant pieces of the exam. If you have questions, please do not hesitate to call me. I look forward to following Kim along with you.         Sincerely,        Tico Wilder MD        CC:   Amy Edmonds, APRN-CNP         External Exam         Right Left    External Normal Normal              Slit Lamp Exam         Right Left    Lids/Lashes Normal Normal    Conjunctiva/Sclera White and quiet White and quiet    Cornea Clear Clear    Anterior Chamber Deep and quiet Deep and quiet    Iris Round and reactive Round and reactive    Lens 1+ nuclear sclerosis 1+ nuclear sclerosis              Fundus Exam         Right Left    Vitreous Normal Normal    Disc Normal  "Normal    C/D Ratio 0.3 0.3    Macula Normal Normal    Vessels Normal Normal    Periphery Normal Normal                   <div id=\"[email or web address]\"></div>     "

## 2024-02-09 NOTE — PROGRESS NOTES
Assessment/Plan   Problem List Items Addressed This Visit          Eye/Vision problems    Type 2 diabetes mellitus without complication, without long-term current use of insulin (CMS/[facility]) - Primary     Advised no signs of diabetic changes on exam. Recommend to continue best sugar control and on need for annual checkup. Understands role of good BP control and weight loss if applicable. Discussed some components of selected studies like WESDR, DCCT, and UKPDS to describe the likely course of diabetes and effects on the eyes.           Nuclear senile cataract     Non significant cataract noted on exam. Will plan to continue to monitor with serial exam. May have some component of glare but OK with waiting to test glare until next year, will call us sooner if worsening symptoms.          Refraction error     Discussed glasses prescription from refraction. Will provide if patient interested in keeping for records or to fill as a new set of glasses.               Provided reassurance regarding above diagnoses and care received in the office visit today. Discussed outcomes and options along with the importance of treatment compliance. Understands the importance of any follow up visits. Patient instructed to call/communicate with our office if any new issues, questions, or concerns.     Will plan to see back in 1 year full with glare or sooner PRN

## 2024-02-09 NOTE — PATIENT INSTRUCTIONS
Thank you so much for choosing me to provide your care today!    If you were dilated your vision may remain blurry   or light sensitive for several hours.    The nature of eye and vision problems can require frequent follow up, please make every effort to adhere to any future appointments.    If you have any issues, questions, or concerns,   please do not hesitate to reach out.    If you receive a survey in regards to your care today, please mention any exceptional care my office staff and/or technicians provided.    You can reach our office at this number:  [phone]

## 2024-02-12 ENCOUNTER — APPOINTMENT (OUTPATIENT)
Dept: ENDOCRINOLOGY | Facility: CLINIC | Age: 59
End: 2024-02-12
Payer: COMMERCIAL

## 2024-02-15 DIAGNOSIS — E11.65 TYPE 2 DIABETES MELLITUS WITH HYPERGLYCEMIA, WITHOUT LONG-TERM CURRENT USE OF INSULIN (MULTI): Primary | ICD-10-CM

## 2024-02-15 PROCEDURE — RXMED WILLOW AMBULATORY MEDICATION CHARGE

## 2024-02-15 RX ORDER — DAPAGLIFLOZIN 5 MG/1
5 TABLET, FILM COATED ORAL DAILY
Qty: 90 TABLET | Refills: 3 | Status: SHIPPED | OUTPATIENT
Start: 2024-02-15 | End: 2025-02-14

## 2024-02-17 ENCOUNTER — PHARMACY VISIT (OUTPATIENT)
Dept: PHARMACY | Facility: CLINIC | Age: 59
End: 2024-02-17
Payer: COMMERCIAL

## 2024-03-13 PROCEDURE — RXMED WILLOW AMBULATORY MEDICATION CHARGE

## 2024-03-18 ENCOUNTER — PHARMACY VISIT (OUTPATIENT)
Dept: PHARMACY | Facility: CLINIC | Age: 59
End: 2024-03-18
Payer: COMMERCIAL

## 2024-03-19 ENCOUNTER — LAB (OUTPATIENT)
Dept: LAB | Facility: LAB | Age: 59
End: 2024-03-19
Payer: COMMERCIAL

## 2024-03-19 DIAGNOSIS — K22.2 GERD WITH STRICTURE: Primary | ICD-10-CM

## 2024-03-19 DIAGNOSIS — Z00.00 ENCOUNTER FOR PREVENTATIVE ADULT HEALTH CARE EXAMINATION: ICD-10-CM

## 2024-03-19 DIAGNOSIS — E11.9 TYPE 2 DIABETES MELLITUS WITHOUT COMPLICATION, WITHOUT LONG-TERM CURRENT USE OF INSULIN (MULTI): ICD-10-CM

## 2024-03-19 DIAGNOSIS — K21.9 GERD WITH STRICTURE: Primary | ICD-10-CM

## 2024-03-19 DIAGNOSIS — Z01.84 IMMUNITY STATUS TESTING: ICD-10-CM

## 2024-03-19 LAB
25(OH)D3 SERPL-MCNC: 42 NG/ML (ref 30–100)
ALBUMIN SERPL BCP-MCNC: 4.4 G/DL (ref 3.4–5)
ALP SERPL-CCNC: 70 U/L (ref 33–110)
ALT SERPL W P-5'-P-CCNC: 19 U/L (ref 7–45)
ANION GAP SERPL CALC-SCNC: 13 MMOL/L (ref 10–20)
AST SERPL W P-5'-P-CCNC: 20 U/L (ref 9–39)
BASOPHILS # BLD AUTO: 0.05 X10*3/UL (ref 0–0.1)
BASOPHILS NFR BLD AUTO: 0.7 %
BILIRUB SERPL-MCNC: 0.5 MG/DL (ref 0–1.2)
BUN SERPL-MCNC: 15 MG/DL (ref 6–23)
CALCIUM SERPL-MCNC: 9.8 MG/DL (ref 8.6–10.6)
CHLORIDE SERPL-SCNC: 102 MMOL/L (ref 98–107)
CHOLEST SERPL-MCNC: 162 MG/DL (ref 0–199)
CHOLESTEROL/HDL RATIO: 3
CO2 SERPL-SCNC: 30 MMOL/L (ref 21–32)
CREAT SERPL-MCNC: 0.72 MG/DL (ref 0.5–1.05)
CREAT UR-MCNC: 133.8 MG/DL (ref 20–320)
EGFRCR SERPLBLD CKD-EPI 2021: >90 ML/MIN/1.73M*2
EOSINOPHIL # BLD AUTO: 0.16 X10*3/UL (ref 0–0.7)
EOSINOPHIL NFR BLD AUTO: 2.2 %
ERYTHROCYTE [DISTWIDTH] IN BLOOD BY AUTOMATED COUNT: 11.8 % (ref 11.5–14.5)
EST. AVERAGE GLUCOSE BLD GHB EST-MCNC: 169 MG/DL
GLUCOSE SERPL-MCNC: 146 MG/DL (ref 74–99)
HBA1C MFR BLD: 7.5 %
HBV SURFACE AB SER-ACNC: 16.6 MIU/ML
HBV SURFACE AG SERPL QL IA: NONREACTIVE
HCT VFR BLD AUTO: 44.1 % (ref 36–46)
HDLC SERPL-MCNC: 54.1 MG/DL
HGB BLD-MCNC: 14.8 G/DL (ref 12–16)
IMM GRANULOCYTES # BLD AUTO: 0.01 X10*3/UL (ref 0–0.7)
IMM GRANULOCYTES NFR BLD AUTO: 0.1 % (ref 0–0.9)
LDLC SERPL CALC-MCNC: 90 MG/DL
LYMPHOCYTES # BLD AUTO: 1.89 X10*3/UL (ref 1.2–4.8)
LYMPHOCYTES NFR BLD AUTO: 26.3 %
MCH RBC QN AUTO: 31.5 PG (ref 26–34)
MCHC RBC AUTO-ENTMCNC: 33.6 G/DL (ref 32–36)
MCV RBC AUTO: 94 FL (ref 80–100)
MICROALBUMIN UR-MCNC: 8.5 MG/L
MICROALBUMIN/CREAT UR: 6.4 UG/MG CREAT
MONOCYTES # BLD AUTO: 0.51 X10*3/UL (ref 0.1–1)
MONOCYTES NFR BLD AUTO: 7.1 %
NEUTROPHILS # BLD AUTO: 4.56 X10*3/UL (ref 1.2–7.7)
NEUTROPHILS NFR BLD AUTO: 63.6 %
NON HDL CHOLESTEROL: 108 MG/DL (ref 0–149)
NRBC BLD-RTO: 0 /100 WBCS (ref 0–0)
PLATELET # BLD AUTO: 247 X10*3/UL (ref 150–450)
POTASSIUM SERPL-SCNC: 4.3 MMOL/L (ref 3.5–5.3)
PROT SERPL-MCNC: 7.6 G/DL (ref 6.4–8.2)
RBC # BLD AUTO: 4.7 X10*6/UL (ref 4–5.2)
SODIUM SERPL-SCNC: 141 MMOL/L (ref 136–145)
TRIGL SERPL-MCNC: 91 MG/DL (ref 0–149)
TSH SERPL-ACNC: 2.52 MIU/L (ref 0.44–3.98)
VLDL: 18 MG/DL (ref 0–40)
WBC # BLD AUTO: 7.2 X10*3/UL (ref 4.4–11.3)

## 2024-03-19 PROCEDURE — 80061 LIPID PANEL: CPT

## 2024-03-19 PROCEDURE — 80053 COMPREHEN METABOLIC PANEL: CPT

## 2024-03-19 PROCEDURE — 82043 UR ALBUMIN QUANTITATIVE: CPT

## 2024-03-19 PROCEDURE — RXMED WILLOW AMBULATORY MEDICATION CHARGE

## 2024-03-19 PROCEDURE — 82306 VITAMIN D 25 HYDROXY: CPT

## 2024-03-19 PROCEDURE — 86706 HEP B SURFACE ANTIBODY: CPT

## 2024-03-19 PROCEDURE — 36415 COLL VENOUS BLD VENIPUNCTURE: CPT

## 2024-03-19 PROCEDURE — 83036 HEMOGLOBIN GLYCOSYLATED A1C: CPT

## 2024-03-19 PROCEDURE — 85025 COMPLETE CBC W/AUTO DIFF WBC: CPT

## 2024-03-19 PROCEDURE — 84443 ASSAY THYROID STIM HORMONE: CPT

## 2024-03-19 PROCEDURE — 87340 HEPATITIS B SURFACE AG IA: CPT

## 2024-03-19 PROCEDURE — 82570 ASSAY OF URINE CREATININE: CPT

## 2024-03-19 RX ORDER — PANTOPRAZOLE SODIUM 40 MG/1
40 TABLET, DELAYED RELEASE ORAL DAILY
Qty: 30 TABLET | Refills: 11 | Status: SHIPPED | OUTPATIENT
Start: 2024-03-19 | End: 2025-03-19

## 2024-03-21 ENCOUNTER — PHARMACY VISIT (OUTPATIENT)
Dept: PHARMACY | Facility: CLINIC | Age: 59
End: 2024-03-21
Payer: COMMERCIAL

## 2024-03-27 ENCOUNTER — OFFICE VISIT (OUTPATIENT)
Dept: PRIMARY CARE | Facility: CLINIC | Age: 59
End: 2024-03-27
Payer: COMMERCIAL

## 2024-03-27 VITALS
SYSTOLIC BLOOD PRESSURE: 124 MMHG | DIASTOLIC BLOOD PRESSURE: 80 MMHG | HEART RATE: 101 BPM | BODY MASS INDEX: 35.73 KG/M2 | OXYGEN SATURATION: 95 % | HEIGHT: 60 IN | WEIGHT: 182 LBS

## 2024-03-27 DIAGNOSIS — E78.5 HYPERLIPIDEMIA, UNSPECIFIED HYPERLIPIDEMIA TYPE: ICD-10-CM

## 2024-03-27 DIAGNOSIS — E11.9 TYPE 2 DIABETES MELLITUS WITHOUT COMPLICATION, WITHOUT LONG-TERM CURRENT USE OF INSULIN (MULTI): ICD-10-CM

## 2024-03-27 DIAGNOSIS — E08.9 DIABETES MELLITUS DUE TO UNDERLYING CONDITION WITHOUT COMPLICATION, WITHOUT LONG-TERM CURRENT USE OF INSULIN (MULTI): Primary | ICD-10-CM

## 2024-03-27 DIAGNOSIS — E66.9 OBESITY (BMI 30.0-34.9): ICD-10-CM

## 2024-03-27 DIAGNOSIS — K22.2 SCHATZKI'S RING OF DISTAL ESOPHAGUS: ICD-10-CM

## 2024-03-27 DIAGNOSIS — Z12.11 COLON CANCER SCREENING: ICD-10-CM

## 2024-03-27 DIAGNOSIS — F41.9 ANXIETY: ICD-10-CM

## 2024-03-27 PROBLEM — E66.811 OBESITY (BMI 30.0-34.9): Status: RESOLVED | Noted: 2023-08-23 | Resolved: 2024-03-27

## 2024-03-27 PROBLEM — R74.01 TRANSAMINITIS: Status: RESOLVED | Noted: 2023-08-23 | Resolved: 2024-03-27

## 2024-03-27 PROBLEM — E11.65 UNCONTROLLED TYPE 2 DIABETES MELLITUS WITH HYPERGLYCEMIA (MULTI): Status: RESOLVED | Noted: 2023-08-23 | Resolved: 2024-03-27

## 2024-03-27 PROCEDURE — 1036F TOBACCO NON-USER: CPT | Performed by: INTERNAL MEDICINE

## 2024-03-27 PROCEDURE — 3074F SYST BP LT 130 MM HG: CPT | Performed by: INTERNAL MEDICINE

## 2024-03-27 PROCEDURE — 3079F DIAST BP 80-89 MM HG: CPT | Performed by: INTERNAL MEDICINE

## 2024-03-27 PROCEDURE — 99396 PREV VISIT EST AGE 40-64: CPT | Performed by: INTERNAL MEDICINE

## 2024-03-27 PROCEDURE — 3051F HG A1C>EQUAL 7.0%<8.0%: CPT | Performed by: INTERNAL MEDICINE

## 2024-03-27 PROCEDURE — 3048F LDL-C <100 MG/DL: CPT | Performed by: INTERNAL MEDICINE

## 2024-03-27 PROCEDURE — 3061F NEG MICROALBUMINURIA REV: CPT | Performed by: INTERNAL MEDICINE

## 2024-03-27 ASSESSMENT — PROMIS GLOBAL HEALTH SCALE
RATE_AVERAGE_PAIN: 0
EMOTIONAL_PROBLEMS: NEVER
RATE_PHYSICAL_HEALTH: GOOD
RATE_SOCIAL_SATISFACTION: VERY GOOD
RATE_QUALITY_OF_LIFE: VERY GOOD
RATE_MENTAL_HEALTH: VERY GOOD
CARRYOUT_SOCIAL_ACTIVITIES: VERY GOOD
RATE_GENERAL_HEALTH: GOOD
CARRYOUT_PHYSICAL_ACTIVITIES: COMPLETELY

## 2024-03-27 ASSESSMENT — PATIENT HEALTH QUESTIONNAIRE - PHQ9: 2. FEELING DOWN, DEPRESSED OR HOPELESS: NOT AT ALL

## 2024-03-27 NOTE — PATIENT INSTRUCTIONS
Kim, it was a pleasure to see you today! Here is a list of things we have discussed and to follow up on:    Pap smear - Gynecology referral - I recommend Dr. Sonja Lorenz ([phone]), Dr. Flakita Ag ([phone])  or Dr. Janneth Paulson ([phone]).   Anxiety - behavioral health resources provided.   Stool based colon testing ordered - can be picked up at the lab   Followup 6 months

## 2024-03-28 NOTE — ASSESSMENT & PLAN NOTE
With subsequent dilation recommended indefinite PPI, discussed association studies with regards to long-term use of PPI and that at this time benefits of its use outweigh its risks.  Will follow-up with GI for further discussion

## 2024-03-28 NOTE — ASSESSMENT & PLAN NOTE
Extensively discussed  With increase in A1c secondary to discontinuation of Ozempic and subsequent hyperglycemia.  Now reinstituted Ozempic and Farxiga.  Not interested in further management at present but is frustrated by lack of success at present.  Continue lifestyle modifications.  Will continue to monitor, has upcoming appointment scheduled with Amy Ascencio.  No known complications, no neuropathy no albuminuria up-to-date with ophthalmologic examination.  On rosuvastatin.

## 2024-03-28 NOTE — ASSESSMENT & PLAN NOTE
Not interested in medical management, behavioral health resources provided.  Encouraged to schedule.

## 2024-03-28 NOTE — ASSESSMENT & PLAN NOTE
With improvement in lipid profile after institution of rosuvastatin we will continue to periodically monitor.

## 2024-04-11 PROCEDURE — RXMED WILLOW AMBULATORY MEDICATION CHARGE

## 2024-04-13 ENCOUNTER — PHARMACY VISIT (OUTPATIENT)
Dept: PHARMACY | Facility: CLINIC | Age: 59
End: 2024-04-13
Payer: COMMERCIAL

## 2024-05-07 ENCOUNTER — PHARMACY VISIT (OUTPATIENT)
Dept: PHARMACY | Facility: CLINIC | Age: 59
End: 2024-05-07
Payer: COMMERCIAL

## 2024-05-07 PROCEDURE — RXMED WILLOW AMBULATORY MEDICATION CHARGE

## 2024-05-10 ENCOUNTER — PHARMACY VISIT (OUTPATIENT)
Dept: PHARMACY | Facility: CLINIC | Age: 59
End: 2024-05-10
Payer: COMMERCIAL

## 2024-06-03 ENCOUNTER — OFFICE VISIT (OUTPATIENT)
Dept: ENDOCRINOLOGY | Facility: CLINIC | Age: 59
End: 2024-06-03
Payer: COMMERCIAL

## 2024-06-03 VITALS
BODY MASS INDEX: 35.34 KG/M2 | DIASTOLIC BLOOD PRESSURE: 84 MMHG | HEIGHT: 60 IN | SYSTOLIC BLOOD PRESSURE: 134 MMHG | HEART RATE: 99 BPM | WEIGHT: 180 LBS

## 2024-06-03 DIAGNOSIS — E11.65 TYPE 2 DIABETES MELLITUS WITH HYPERGLYCEMIA, WITHOUT LONG-TERM CURRENT USE OF INSULIN (MULTI): ICD-10-CM

## 2024-06-03 PROCEDURE — 99214 OFFICE O/P EST MOD 30 MIN: CPT | Performed by: NURSE PRACTITIONER

## 2024-06-03 PROCEDURE — 3061F NEG MICROALBUMINURIA REV: CPT | Performed by: NURSE PRACTITIONER

## 2024-06-03 PROCEDURE — 95251 CONT GLUC MNTR ANALYSIS I&R: CPT | Performed by: NURSE PRACTITIONER

## 2024-06-03 PROCEDURE — 3075F SYST BP GE 130 - 139MM HG: CPT | Performed by: NURSE PRACTITIONER

## 2024-06-03 PROCEDURE — 1036F TOBACCO NON-USER: CPT | Performed by: NURSE PRACTITIONER

## 2024-06-03 PROCEDURE — 3079F DIAST BP 80-89 MM HG: CPT | Performed by: NURSE PRACTITIONER

## 2024-06-03 PROCEDURE — RXMED WILLOW AMBULATORY MEDICATION CHARGE

## 2024-06-03 PROCEDURE — 3048F LDL-C <100 MG/DL: CPT | Performed by: NURSE PRACTITIONER

## 2024-06-03 PROCEDURE — 3051F HG A1C>EQUAL 7.0%<8.0%: CPT | Performed by: NURSE PRACTITIONER

## 2024-06-03 RX ORDER — ROSUVASTATIN CALCIUM 5 MG/1
5 TABLET, COATED ORAL DAILY
Qty: 90 TABLET | Refills: 3 | Status: SHIPPED | OUTPATIENT
Start: 2024-06-03

## 2024-06-03 RX ORDER — BLOOD-GLUCOSE SENSOR
EACH MISCELLANEOUS
Qty: 6 EACH | Refills: 3 | Status: SHIPPED | OUTPATIENT
Start: 2024-06-03

## 2024-06-03 NOTE — PROGRESS NOTES
Subjective   Kim Gallegos is a 58 y.o. female presents today for a follow up of DM Type 2.  Initial diagnosis with prediabetes around .  Known diabetes about 7 years ago.  History of gestational diabetes with both children and was diet controlled. Never needed insulin with either pregnancy. The patient has a family history of Type 1 in father. Sister  of MS. Other sister may had lupus.   Known complications include: obesity     Previously seeing PCP for diabetes care.   Last visit with me 2023  Previous A1c 7.5% in 3/2024.  Previous 6.3% on 2023.   Since last visit, in Feb-March noted higher readings  We had put her back on Farxiga   Using Eloy intermittently   She continues to take ozmepic 0.5 mg once weekly   Has intermittent nausea but she is seeing GI.   Unsure if actually related to ozempic    Current diabetes regimen is as follows:   Ozempic 0.5 mg once weekly   Farxiga 5 mg once daily       Patient is using continuous glucose monitor - Eloy 3  The patient is currently checking the blood glucose as needed        Hypoglycemia frequency: 0%  Hypoglycemia awareness: yes     Regarding symptoms of hyperglycemia, the patient is not experiencing any symptoms such as polyuria, polydipsia, nocturia or rapid weight loss or blurry vision. The patient comes into the office today with need of refills.        ROS  General: no fever, chills or acute changes in weight in the last 6 months  Skin: no rashes, pruritis or dry skin  Cardiac: denies chest pain, heart palpitations or orthopnea  Pulmonary: denies wheezing, productive cough or exertional dyspnea      Objective    Physical Exam  Blood pressure 134/84, pulse 99, height 1.524 m (5'), weight 81.6 kg (180 lb).  General: not in acute distress, cooperative   Respiratory: normal respiratory effort  Musculoskeletal: normal gait       Current Outpatient Medications:     dapagliflozin propanediol (Farxiga) 5 mg, Take 1 tablet (5 mg) by mouth once daily.,  Disp: 90 tablet, Rfl: 3    pantoprazole (ProtoNix) 40 mg EC tablet, Take 1 tablet (40 mg) by mouth once daily. Do not crush, chew, or split., Disp: 30 tablet, Rfl: 11    rosuvastatin (Crestor) 5 mg tablet, Take 1 tablet (5 mg) by mouth once daily., Disp: , Rfl:     semaglutide 0.25 mg or 0.5 mg (2 mg/3 mL) pen injector, INJECT 0.5 MG UNDER THE SKIN ONCE WEEKLY AS DIRECTED, Disp: 9 mL, Rfl: 3    Assessment/Plan   Type 2 diabetes with hyperglycemia with long term use insulin:    - A1c not at goal. IT was up from previous.  Most recent Niecy fell off and she has not put back on.  Of the 5 days of readings, she was in target range 73%.  New sensor placed in office today.  I will look at readings in two weeks.  For now will keep her meds the same.  Previous A1c at goal with ozempic 1 mg but was unable to tolerate this dose.  Will continue current doses of farxiga and ozempic and reevaluate once I have more CGM data.      Plan:   Continue farxiga 5 mg once daily   Continue ozempic 0.5 mg once weekly   Wear Niecy  I will review readings in two weeks  Follow up in 6 months

## 2024-06-04 ENCOUNTER — TELEMEDICINE (OUTPATIENT)
Dept: PHARMACY | Facility: HOSPITAL | Age: 59
End: 2024-06-04
Payer: COMMERCIAL

## 2024-06-04 DIAGNOSIS — E11.9 TYPE 2 DIABETES MELLITUS WITHOUT COMPLICATION, WITHOUT LONG-TERM CURRENT USE OF INSULIN (MULTI): Primary | ICD-10-CM

## 2024-06-04 NOTE — PROGRESS NOTES
[facility] ([facility])    Kim Gallegos is a 58 y.o. female was referred to Clinical Pharmacy Team to complete a comprehensive medication review (CMR) with a pharmacist as part of the [employer] diabetes program.      Referring Provider: Amy Edmonds, APR*    Subjective   No Known Allergies    [facility]  [address]  [facility] [address]  Phone: [phone] Fax: [phone]    [facility]  [address]  [facility] [address]  Phone: [phone] Fax: [phone]      Ms. Gallegos returns to the pharmacy team for T2DM medication review as part of the VBID program. Since last year A1c has slightly elevated to 7.5%. She was not able to tolerate increased ozempic 1 mg dosing, previously was on metformin and didn't tolerate as well. Currently taking ozempic 0.5 mg weekly and farxiga 5 mg daily. Is wearing a annabel for CGM, had issues with it falling off. No other concerns currently -- following up with endo in 2 weeks to report results.      Objective         1/29/2024     8:45 AM 1/29/2024     9:13 AM 1/29/2024     9:25 AM 1/29/2024     9:28 AM 1/29/2024     9:43 AM 3/27/2024    12:35 PM 6/3/2024     9:53 AM   Vitals   Systolic  104 127 127 116 124 134   Diastolic  61 68 68 66 80 84   Heart Rate  68 67 69 62 101 99   Temp  36.1 °C (97 °F)   36.1 °C (97 °F)     Resp  16 14 14 14     Height (in)      1.524 m (5') 1.524 m (5')   Weight (lb) 178.57     182 180   BMI 34.88 kg/m2     35.54 kg/m2 35.15 kg/m2   BSA (m2) 1.85 m2     1.87 m2 1.86 m2   Visit Report      Report Report       LAB  Lab Results   Component Value Date    BILITOT 0.5 03/19/2024    CALCIUM 9.8 03/19/2024    CO2 30 03/19/2024     03/19/2024    CREATININE 0.72 03/19/2024    GLUCOSE 146 (H) 03/19/2024    ALKPHOS 70 03/19/2024    K 4.3 03/19/2024    PROT 7.6 03/19/2024     03/19/2024    AST 20 03/19/2024    ALT 19 03/19/2024    BUN 15  03/19/2024    ANIONGAP 13 03/19/2024    GGT 15 05/19/2023    ALBUMIN 4.4 03/19/2024    LIPASE 45 05/27/2022    GFRF >90 05/19/2023     Lab Results   Component Value Date    TRIG 91 03/19/2024    CHOL 162 03/19/2024    LDLCALC 90 03/19/2024    HDL 54.1 03/19/2024     Lab Results   Component Value Date    HGBA1C 7.5 (H) 03/19/2024       Current Outpatient Medications on File Prior to Visit   Medication Sig Dispense Refill    blood-glucose sensor (FreeStyle Niecy 3 Sensor) device Use to check blood sugars 4 times per day. Change every 14 days 6 each 3    dapagliflozin propanediol (Farxiga) 5 mg Take 1 tablet (5 mg) by mouth once daily. 90 tablet 3    pantoprazole (ProtoNix) 40 mg EC tablet Take 1 tablet (40 mg) by mouth once daily. Do not crush, chew, or split. 30 tablet 11    rosuvastatin (Crestor) 5 mg tablet Take 1 tablet (5 mg) by mouth once daily. 90 tablet 3    semaglutide 0.25 mg or 0.5 mg (2 mg/3 mL) pen injector INJECT 0.5 MG UNDER THE SKIN ONCE WEEKLY AS DIRECTED 9 mL 3    [DISCONTINUED] rosuvastatin (Crestor) 5 mg tablet Take 1 tablet (5 mg) by mouth once daily.      [DISCONTINUED] semaglutide 0.25 mg or 0.5 mg (2 mg/3 mL) pen injector INJECT 0.5 MG UNDER THE SKIN ONCE WEEKLY AS DIRECTED 9 mL 3     No current facility-administered medications on file prior to visit.            DRUG INTERACTIONS  - n/a    SECONDARY PREVENTION  - Statin? yes  - ACE-I/ARB? no    ASSESSMENT/PLAN:   Employee Health Diabetes Program (VBID)  - Patient enrolled in  Employee diabetes program for $0 co-pays on diabetes medications/supplies. Enrollment should be active in 2-4 weeks.  - Requested VBID enrollment date:   - PharmD Management Level:     Assessment/Plan   Problem List Items Addressed This Visit       Type 2 diabetes mellitus without complication, without long-term current use of insulin (Multi) - Primary    1. Continue taking farxiga 5 mg daily  2. Continue taking ozempic 0.5 mg subcutaneous weekly  3. Follow up with  endo in 2 weeks for BG readings      Follow up: 1 year or as needed     Continue all meds under the continuation of care with the referring provider and clinical pharmacy team.    Sky Rubi PharmD     Verbal consent to manage patient's drug therapy was obtained from [the patient and/or an individual authorized to act on behalf of a patient]. They were informed they may decline to participate or withdraw from participation in pharmacy services at any time.

## 2024-06-07 ENCOUNTER — PHARMACY VISIT (OUTPATIENT)
Dept: PHARMACY | Facility: CLINIC | Age: 59
End: 2024-06-07
Payer: COMMERCIAL

## 2024-07-10 DIAGNOSIS — E78.5 HYPERLIPIDEMIA, UNSPECIFIED HYPERLIPIDEMIA TYPE: Primary | ICD-10-CM

## 2024-07-10 RX ORDER — ATORVASTATIN CALCIUM 10 MG/1
10 TABLET, FILM COATED ORAL DAILY
Qty: 90 TABLET | Refills: 0 | Status: SHIPPED | OUTPATIENT
Start: 2024-07-10 | End: 2025-07-10

## 2024-08-03 ENCOUNTER — PHARMACY VISIT (OUTPATIENT)
Dept: PHARMACY | Facility: CLINIC | Age: 59
End: 2024-08-03
Payer: COMMERCIAL

## 2024-08-03 PROCEDURE — RXMED WILLOW AMBULATORY MEDICATION CHARGE

## 2024-08-21 PROCEDURE — RXMED WILLOW AMBULATORY MEDICATION CHARGE

## 2024-08-24 ENCOUNTER — PHARMACY VISIT (OUTPATIENT)
Dept: PHARMACY | Facility: CLINIC | Age: 59
End: 2024-08-24
Payer: COMMERCIAL

## 2024-08-24 PROCEDURE — RXMED WILLOW AMBULATORY MEDICATION CHARGE

## 2024-08-30 PROCEDURE — RXMED WILLOW AMBULATORY MEDICATION CHARGE

## 2024-09-05 ENCOUNTER — PHARMACY VISIT (OUTPATIENT)
Dept: PHARMACY | Facility: CLINIC | Age: 59
End: 2024-09-05
Payer: COMMERCIAL

## 2024-10-01 ENCOUNTER — APPOINTMENT (OUTPATIENT)
Dept: PRIMARY CARE | Facility: CLINIC | Age: 59
End: 2024-10-01
Payer: COMMERCIAL

## 2024-10-02 PROCEDURE — RXMED WILLOW AMBULATORY MEDICATION CHARGE

## 2024-10-07 ENCOUNTER — PHARMACY VISIT (OUTPATIENT)
Dept: PHARMACY | Facility: CLINIC | Age: 59
End: 2024-10-07
Payer: COMMERCIAL

## 2024-10-21 ENCOUNTER — OFFICE VISIT (OUTPATIENT)
Dept: GASTROENTEROLOGY | Facility: CLINIC | Age: 59
End: 2024-10-21
Payer: COMMERCIAL

## 2024-10-21 VITALS
HEIGHT: 60 IN | SYSTOLIC BLOOD PRESSURE: 124 MMHG | DIASTOLIC BLOOD PRESSURE: 84 MMHG | TEMPERATURE: 97 F | HEART RATE: 106 BPM | BODY MASS INDEX: 33.77 KG/M2 | WEIGHT: 172 LBS

## 2024-10-21 DIAGNOSIS — Z12.11 SCREEN FOR COLON CANCER: Primary | ICD-10-CM

## 2024-10-21 DIAGNOSIS — K21.9 GASTROESOPHAGEAL REFLUX DISEASE, UNSPECIFIED WHETHER ESOPHAGITIS PRESENT: ICD-10-CM

## 2024-10-21 PROCEDURE — 3048F LDL-C <100 MG/DL: CPT | Performed by: STUDENT IN AN ORGANIZED HEALTH CARE EDUCATION/TRAINING PROGRAM

## 2024-10-21 PROCEDURE — 3061F NEG MICROALBUMINURIA REV: CPT | Performed by: STUDENT IN AN ORGANIZED HEALTH CARE EDUCATION/TRAINING PROGRAM

## 2024-10-21 PROCEDURE — 3074F SYST BP LT 130 MM HG: CPT | Performed by: STUDENT IN AN ORGANIZED HEALTH CARE EDUCATION/TRAINING PROGRAM

## 2024-10-21 PROCEDURE — 3051F HG A1C>EQUAL 7.0%<8.0%: CPT | Performed by: STUDENT IN AN ORGANIZED HEALTH CARE EDUCATION/TRAINING PROGRAM

## 2024-10-21 PROCEDURE — 99214 OFFICE O/P EST MOD 30 MIN: CPT | Performed by: STUDENT IN AN ORGANIZED HEALTH CARE EDUCATION/TRAINING PROGRAM

## 2024-10-21 PROCEDURE — 3079F DIAST BP 80-89 MM HG: CPT | Performed by: STUDENT IN AN ORGANIZED HEALTH CARE EDUCATION/TRAINING PROGRAM

## 2024-10-21 PROCEDURE — 1036F TOBACCO NON-USER: CPT | Performed by: STUDENT IN AN ORGANIZED HEALTH CARE EDUCATION/TRAINING PROGRAM

## 2024-10-21 PROCEDURE — 3008F BODY MASS INDEX DOCD: CPT | Performed by: STUDENT IN AN ORGANIZED HEALTH CARE EDUCATION/TRAINING PROGRAM

## 2024-10-21 RX ORDER — FLUTICASONE PROPIONATE 50 MCG
2 SPRAY, SUSPENSION (ML) NASAL DAILY
COMMUNITY
Start: 2022-12-09

## 2024-10-21 ASSESSMENT — PAIN SCALES - GENERAL: PAINLEVEL_OUTOF10: 0-NO PAIN

## 2024-10-21 NOTE — PROGRESS NOTES
REASON FOR VISIT:  fu scope     HPI:  Kim Gallegos is a 59 y.o. female with a pmhx of HLD, DM2, anxiety, Schatzki's ring s/p dilation who presents for fu from EGD.     Last seen by Chiara Dia 12/2023 for dysphagia. Recommended EGD as below, s/p dilation of schatzki's ring.    Today, she denies any dysphagia or odynophagia since dilation. Still has occasional GERD symptoms if she sleeps flat, wakes up with sour taste in mouth and regurgitation. Has a difficult time staying on her pillows, however if she remains inclined no symptoms. No daytime symptoms. Takes pantoprazole first thing in the am on an empty stomach. Falls off her 2-3 pillows at night. No late night snacking. No dysphagia or odynophagia since EGD. No abdominal pain, n/v. No diarrhea or constipation. Not interested in colonoscopy.      Med list notable for Ozempic and pantoprazole.     Labs notable for unremarkable CBC and CMP 3/2024.     Esophagram 12/2023: Sliding hiatal hernia is noted with prone positioning     No fhx of CRC, gastric, pancreatic, or esophageal cancer. No tobacco use or alcohol use.     Prev endoscopic eval:   EGD 1/2024:   Obstructing Schatzki ring measuring 10 mm in the GE junction (34 cm from the incisors); dilated with balloon dilator from 15 mm starting size to 18 mm end size. Dilation caused disruption of ring, improved passage of the scope and improved lumen appearance  Small sliding hiatal hernia (type I hiatal hernia)    FIT 5/2023: negative    REVIEW OF SYSTEMS  CONSTITUTIONAL: Negative for fevers  HEENT: Negative for frequent/significant headaches  RESPIRATORY: Negative for hemoptysis  CARDIOVASCULAR: Negative for chest pain  GI: See HPI  : Negative for hematuria  MUSCULOSKELETAL: Negative for arthralgia or myalgia  INTEGUMENTARY/SKIN: Negative for rash or skin lesion  HEMATOLOGY/LYMPHOLOGY: Negative for prolonged bleeding  ENDOCRINE: Negative for cold/heat intolerance  NEURO: Negative for encephalopathy  PSYCH:  Negative for new changes in mood or affect      No Known Allergies    Past Medical History:   Diagnosis Date    Diabetes mellitus (Multi) 3/1/2022    Dysphagia     GERD (gastroesophageal reflux disease)     Hyperlipidemia     Varicella 1970    Visual impairment 1982    Wears glasses        Past Surgical History:   Procedure Laterality Date     SECTION, LOW TRANSVERSE  1997       Family History   Problem Relation Name Age of Onset    Breast cancer Mother Zahraa Man     Heart disease Father Dad     Mental illness Father Dad     Diabetes Father Dad     Multiple sclerosis Sister      No Known Problems Brother      No Known Problems Son      No Known Problems Son      No Known Problems Sibling         Social History     Tobacco Use    Smoking status: Former     Current packs/day: 0.00     Average packs/day: 0.3 packs/day for 1 year (0.3 ttl pk-yrs)     Types: Cigarettes     Start date: 2001     Quit date: 2002     Years since quittin.8    Smokeless tobacco: Never    Tobacco comments:     Mostly off with me passively picking it up for a year here and there until    Substance Use Topics    Alcohol use: Yes     Alcohol/week: 1.0 standard drink of alcohol     Types: 1 Shots of liquor per week       Current Outpatient Medications   Medication Sig Dispense Refill    blood-glucose sensor (FreeStyle Niecy 3 Sensor) device Use to check blood sugars 4 times per day. Change every 14 days 6 each 3    dapagliflozin propanediol (Farxiga) 5 mg Take 1 tablet (5 mg) by mouth once daily. 90 tablet 3    fluticasone (Flonase) 50 mcg/actuation nasal spray Administer 2 sprays into each nostril once daily.      pantoprazole (ProtoNix) 40 mg EC tablet Take 1 tablet (40 mg) by mouth once daily. Do not crush, chew, or split. 30 tablet 11    rosuvastatin (Crestor) 5 mg tablet Take 1 tablet (5 mg) by mouth once daily. 90 tablet 3    semaglutide 0.25 mg or 0.5 mg (2 mg/3 mL) pen injector INJECT 0.5 MG UNDER THE SKIN  ONCE WEEKLY AS DIRECTED 9 mL 3    atorvastatin (Lipitor) 10 mg tablet Take 1 tablet (10 mg) by mouth once daily. 90 tablet 0     No current facility-administered medications for this visit.     PHYSICAL EXAM:  /84   Pulse 106   Temp 36.1 °C (97 °F) (Temporal)   Ht 1.524 m (5')   Wt 78 kg (172 lb)   BMI 33.59 kg/m²    Gen: aaox3, NAD  Head: Normocephalic, atraumatic  Eyes: Sclera anicteric, conjunctiva pink   Neck: Supple  Heart: RRR, no murmurs, rubs or gallops  Lungs: CTAB, non-labored breathing  Abd: Soft, non-distended, non-tender, bowel sounds present, no rebound or guarding, no organomegaly  Ext: No LE edema b/l  Neuro: no gross focal deficits  Psych: Congruent mood and affect, appropriate insight and judgement  Skin: No jaundice    Lab Results   Component Value Date    ALT 19 03/19/2024    AST 20 03/19/2024    GGT 15 05/19/2023    ALKPHOS 70 03/19/2024    BILITOT 0.5 03/19/2024       ASSESSMENT  Schatski's ring s/p dilation  Dysphagia, resolved s/p dilation  Avg risk CRC screening  GERD  Obesity, BMI 33    No further dysphagia since dilation. GERD well controlled on ppi unless sleeps flat, recommend wedge pillow as falls off stacked pillows at night. Overdue for CRC screening, prefers FIT testing.     PLAN  --cont pantoprazole daily  --use wedge pillow for nighttime  --pepcid prn breakthrough symptoms  --antireflux measures (avoid trigger foods, bed elevation, upright after meals, avoid late night snacking)  --on Ozempic   --repeat EGD prn symptoms   --FIT ordered  --HCV 2022 negative    FU prn    Consultation requested by Dr. Aditi Pena DO.   My final recommendations will be communicated back to the requesting physician by way of shared Medical record or letter to requesting physician via fax.    Signature: Ramandeep Delacruz MD    Date: 10/21/2024  Time: 9:16 AM

## 2024-10-21 NOTE — PATIENT INSTRUCTIONS
Thank you for seeing us in clinic today!     In summary, please do the following:  Use a wedge pillow for sleeping.   Continue your pantoprazole as you are. You can also take this 1 hour before dinner.  Take a pepcid 20 mg (famotidine) as needed for breakthrough symptoms at night.  Please get your FIT testing at your earliest convenience.      If you have any questions or concerns, please call the office at [phone].

## 2024-11-14 PROCEDURE — RXMED WILLOW AMBULATORY MEDICATION CHARGE

## 2024-12-04 PROCEDURE — RXMED WILLOW AMBULATORY MEDICATION CHARGE

## 2024-12-06 ENCOUNTER — PHARMACY VISIT (OUTPATIENT)
Dept: PHARMACY | Facility: CLINIC | Age: 59
End: 2024-12-06
Payer: COMMERCIAL

## 2025-01-13 ENCOUNTER — APPOINTMENT (OUTPATIENT)
Dept: ENDOCRINOLOGY | Facility: CLINIC | Age: 60
End: 2025-01-13
Payer: COMMERCIAL

## 2025-02-14 ENCOUNTER — APPOINTMENT (OUTPATIENT)
Dept: OPHTHALMOLOGY | Facility: CLINIC | Age: 60
End: 2025-02-14
Payer: COMMERCIAL